# Patient Record
Sex: MALE | HISPANIC OR LATINO
[De-identification: names, ages, dates, MRNs, and addresses within clinical notes are randomized per-mention and may not be internally consistent; named-entity substitution may affect disease eponyms.]

---

## 2018-11-12 ENCOUNTER — RX RENEWAL (OUTPATIENT)
Age: 63
End: 2018-11-12

## 2019-02-07 ENCOUNTER — RX RENEWAL (OUTPATIENT)
Age: 64
End: 2019-02-07

## 2019-04-15 ENCOUNTER — APPOINTMENT (OUTPATIENT)
Dept: CARDIOLOGY | Facility: CLINIC | Age: 64
End: 2019-04-15
Payer: MEDICAID

## 2019-04-15 ENCOUNTER — NON-APPOINTMENT (OUTPATIENT)
Age: 64
End: 2019-04-15

## 2019-04-15 ENCOUNTER — RECORD ABSTRACTING (OUTPATIENT)
Age: 64
End: 2019-04-15

## 2019-04-15 VITALS
DIASTOLIC BLOOD PRESSURE: 80 MMHG | SYSTOLIC BLOOD PRESSURE: 130 MMHG | RESPIRATION RATE: 16 BRPM | HEART RATE: 68 BPM | HEIGHT: 68 IN | BODY MASS INDEX: 23.04 KG/M2 | WEIGHT: 152 LBS

## 2019-04-15 DIAGNOSIS — Z78.9 OTHER SPECIFIED HEALTH STATUS: ICD-10-CM

## 2019-04-15 DIAGNOSIS — I51.9 HEART DISEASE, UNSPECIFIED: ICD-10-CM

## 2019-04-15 DIAGNOSIS — Z00.00 ENCOUNTER FOR GENERAL ADULT MEDICAL EXAMINATION W/OUT ABNORMAL FINDINGS: ICD-10-CM

## 2019-04-15 DIAGNOSIS — N40.0 BENIGN PROSTATIC HYPERPLASIA WITHOUT LOWER URINARY TRACT SYMPMS: ICD-10-CM

## 2019-04-15 DIAGNOSIS — Z91.89 OTHER SPECIFIED PERSONAL RISK FACTORS, NOT ELSEWHERE CLASSIFIED: ICD-10-CM

## 2019-04-15 PROCEDURE — 99214 OFFICE O/P EST MOD 30 MIN: CPT

## 2019-04-15 PROCEDURE — 93000 ELECTROCARDIOGRAM COMPLETE: CPT

## 2019-04-15 RX ORDER — MULTIVITAMIN
TABLET ORAL DAILY
Refills: 0 | Status: ACTIVE | COMMUNITY

## 2019-04-15 RX ORDER — TAMSULOSIN HYDROCHLORIDE 0.4 MG/1
0.4 CAPSULE ORAL
Qty: 30 | Refills: 5 | Status: ACTIVE | COMMUNITY

## 2019-04-15 RX ORDER — CALCIUM CARBONATE/VITAMIN D3 600 MG-10
TABLET ORAL DAILY
Refills: 0 | Status: ACTIVE | COMMUNITY

## 2019-04-15 RX ORDER — LISINOPRIL 10 MG/1
10 TABLET ORAL
Qty: 90 | Refills: 3 | Status: ACTIVE | COMMUNITY

## 2019-04-16 NOTE — ASSESSMENT
[FreeTextEntry1] : ECG:  Normal sinus rhythm at 68.  Normal axis and intervals.  No significant ST-T wave changes. \par \par Laboratory data 3/22/19 obtained and reviewed:  The hemoglobin is 16.1.  Electrolytes and LFTs are normal. \par \par The PSA is 8.7.  \par \par Impression:\par 1.  The patient has been hemodynamic stable without any signs or symptoms of heart failure. \par 2.  He has rare social alcohol. \par 3.  His medications are well tolerated. \par 4.  He is limiting his alcohol use. \par 5.  Blood pressure and weight have all been stable. \par \par The plan is to:\par 1.  Proceed with an echocardiogram in the near future to reassess left ventricular systolic function.  \par 2.  Continuation of current meds. \par 3.  Obtain a lipid panel through Dr. Nair’s office in the future.  Clinical follow up otherwise in six months. \par

## 2019-04-16 NOTE — REASON FOR VISIT
[FreeTextEntry1] : This is a 63-year-old male who presents here for cardiac reevaluation with regard to history which includes:\par 1.  Hypertension.\par 2.  Hyperlipidemia.\par 3.  Cardiomyopathy. \par 4.  Prior ethanol use. \par \par His initial left ventricular ejection fraction of 45% had improved by his last echocardiogram up to 55-60% with mild mitral and tricuspid insufficiency. \par \par The patient remains feeling relatively well.  He denies any shortness of breath, chest pain, palpitations, PND, orthopnea, or edema.  Only intercurrent medical problem has been the interval development of an elevated PSA for which a prostate biopsy is planned in the near future.  \par \par No PND, orthopnea, syncope, or near syncope. \par \par Social alcohol.  \par \par \par    \par

## 2019-05-07 ENCOUNTER — RX RENEWAL (OUTPATIENT)
Age: 64
End: 2019-05-07

## 2020-04-02 ENCOUNTER — APPOINTMENT (OUTPATIENT)
Dept: CARDIOLOGY | Facility: CLINIC | Age: 65
End: 2020-04-02

## 2020-05-11 ENCOUNTER — RX RENEWAL (OUTPATIENT)
Age: 65
End: 2020-05-11

## 2020-06-08 ENCOUNTER — APPOINTMENT (OUTPATIENT)
Dept: CARDIOLOGY | Facility: CLINIC | Age: 65
End: 2020-06-08

## 2020-06-26 ENCOUNTER — APPOINTMENT (OUTPATIENT)
Dept: CARDIOLOGY | Facility: CLINIC | Age: 65
End: 2020-06-26
Payer: MEDICAID

## 2020-06-26 PROCEDURE — 93306 TTE W/DOPPLER COMPLETE: CPT

## 2020-07-09 ENCOUNTER — APPOINTMENT (OUTPATIENT)
Dept: CARDIOLOGY | Facility: CLINIC | Age: 65
End: 2020-07-09
Payer: MEDICAID

## 2020-07-09 ENCOUNTER — NON-APPOINTMENT (OUTPATIENT)
Age: 65
End: 2020-07-09

## 2020-07-09 VITALS
DIASTOLIC BLOOD PRESSURE: 76 MMHG | HEART RATE: 59 BPM | BODY MASS INDEX: 23.34 KG/M2 | TEMPERATURE: 98.3 F | OXYGEN SATURATION: 98 % | WEIGHT: 154 LBS | SYSTOLIC BLOOD PRESSURE: 110 MMHG | RESPIRATION RATE: 16 BRPM | HEIGHT: 68 IN

## 2020-07-09 DIAGNOSIS — Z87.438 PERSONAL HISTORY OF OTHER DISEASES OF MALE GENITAL ORGANS: ICD-10-CM

## 2020-07-09 PROCEDURE — 99214 OFFICE O/P EST MOD 30 MIN: CPT

## 2020-07-09 PROCEDURE — 93000 ELECTROCARDIOGRAM COMPLETE: CPT

## 2020-07-09 RX ORDER — FINASTERIDE 5 MG/1
5 TABLET, FILM COATED ORAL
Qty: 90 | Refills: 3 | Status: ACTIVE | COMMUNITY
Start: 2020-07-09

## 2020-07-09 NOTE — REASON FOR VISIT
[FreeTextEntry1] : This is a 64 -year-old male who presents here for cardiac reevaluation with regard to history which includes:\par 1.  Hypertension.\par 2.  Hyperlipidemia.\par 3.  Cardiomyopathy. \par 4.  Prior ethanol use. \par \par His initial left ventricular ejection fraction of 45% 2016 had improved by his last echocardiogram up to 55-60%  2017 with mild mitral and tricuspid insufficiency. \par \par \par has now had a repeat performed and is here to review\par \par The patient remains feeling relatively well.  He denies any shortness of breath, chest pain, palpitations, PND, orthopnea, or edema.  Only intercurrent medical problem has been the interval development of an elevated PSA \par Took Finasteride and Flomax with subsequent decrease in PSA\par \par No PND, orthopnea, syncope, or near syncope. \par \par Social alcohol.  \par \par \par    \par

## 2020-07-09 NOTE — ASSESSMENT
[FreeTextEntry1] : ECG:  Normal sinus rhythm at 59.  Normal axis and intervals.  No significant ST-T wave changes. \par \par Laboratory data 6/9/20\par Cholesterol is 262\par HDL 65\par \par Triglycerides 172\par PSA 6.5\par Hemoglobin 15.6\par Electrolytes and LFTs are normal\par \par Echocardiogram: Success 26/20:\par Normal LV size with overall low normal function. Left ventricular ejection fraction 50%\par Aortic root mildly dilated\par Trace mitral insufficiency.\par mildly dilated aortic root\par \par \par Impression:\par 1.  The patient has been hemodynamic stable without any signs or symptoms of heart failure. \par 2.  He has rare social alcohol. \par 3.  His medications are well tolerated, but he has stopped using Atorvastatin as evidenced by the elevated LDL\par 4.  He is limiting his alcohol use. \par 5.  Blood pressure and weight have all been stable. \par 6.  The LVEF has dropped a bit compared with the echo of 2017\par \par The plan is to:\par 1.    Must resume atorvastatin 20 mg a day repeat laboratories in 3 months.\par \par 2. Continuation of other medications ACE inhibitor beta blocker indefinitely.\par \par 3. Slightly dilated aortic root to be checked again in one year.\par \par  Clinical follow up otherwise in six months. \par

## 2020-08-11 ENCOUNTER — RX RENEWAL (OUTPATIENT)
Age: 65
End: 2020-08-11

## 2021-06-22 ENCOUNTER — APPOINTMENT (OUTPATIENT)
Dept: CARDIOLOGY | Facility: CLINIC | Age: 66
End: 2021-06-22
Payer: MEDICAID

## 2021-06-22 VITALS
SYSTOLIC BLOOD PRESSURE: 120 MMHG | HEIGHT: 68 IN | BODY MASS INDEX: 23.19 KG/M2 | DIASTOLIC BLOOD PRESSURE: 80 MMHG | TEMPERATURE: 98.2 F | OXYGEN SATURATION: 97 % | RESPIRATION RATE: 16 BRPM | HEART RATE: 63 BPM | WEIGHT: 153 LBS

## 2021-06-22 PROCEDURE — 99214 OFFICE O/P EST MOD 30 MIN: CPT

## 2021-06-22 PROCEDURE — 93000 ELECTROCARDIOGRAM COMPLETE: CPT

## 2021-06-22 NOTE — ASSESSMENT
[FreeTextEntry1] : ECG:  Normal sinus rhythm at 63.  Normal axis and intervals.  No significant ST-T wave changes. \par \par Laboratory data \par         6/9/20 6/21/2021:\par Chol     262          225\par HDL       65           62\par LDL     163          145\par Trig     172            88\par PSA     6.5           9.5\par Hemoglobin 15.6\par Electrolytes and LFTs are normal\par \par Echocardiogram: Success 6/26/20:\par Normal LV size with overall low normal function. Left ventricular ejection fraction 50%\par Aortic root mildly dilated\par Trace mitral insufficiency.\par mildly dilated aortic root\par \par \par Impression:\par 1.  The patient has been hemodynamic stable without any signs or symptoms of heart failure. \par 2.  He has at least moderate  social alcohol.  \par 3.  His medications are well tolerated, but he has stopped using Atorvastatin consistently as evidenced by the elevated LDL\par 4.  Rising PSA\par 5.  Blood pressure and weight have all been stable. \par 6.  The LVEF had dropped a bit compared with the echo of 2017\par \par The plan is to:\par 1.    Must resume atorvastatin 20 mg a day repeat laboratories in 3 months.\par \par 2. Continuation of other medications ACE inhibitor beta blocker indefinitely.\par \par 3.  History of left ventricular dysfunction and slightly dilated aortic root to be checked again this year\par \par 4.  Urologic evaluation with regard to PSA\par  Clinical follow up otherwise in six months. \par

## 2021-06-22 NOTE — REASON FOR VISIT
[FreeTextEntry1] : This is a 65  -year-old male who presents here for cardiac reevaluation with regard to history which includes:\par 1.  Hypertension.\par 2.  Hyperlipidemia.\par 3.  Cardiomyopathy. \par 4.  Prior ethanol use. \par \par His initial left ventricular ejection fraction of 45% 2016 had improved by his last echocardiogram up to 55-60%  2017 with mild mitral and tricuspid insufficiency. \par \par The patient remains feeling relatively well.  He denies any shortness of breath, chest pain, palpitations, PND, orthopnea, or edema.  Only intercurrent medical problem has been the interval development of an elevated PSA \par Took Finasteride and Flomax with subsequent decrease in PSA\par \par No PND, orthopnea, syncope, or near syncope. \par \par Moderate EtOH several times a week\par \par Noncompliance with atorvastatin.\par \par    \par

## 2021-11-22 ENCOUNTER — APPOINTMENT (OUTPATIENT)
Dept: CARDIOLOGY | Facility: CLINIC | Age: 66
End: 2021-11-22
Payer: MEDICAID

## 2021-11-22 PROCEDURE — 93306 TTE W/DOPPLER COMPLETE: CPT

## 2021-12-07 ENCOUNTER — APPOINTMENT (OUTPATIENT)
Dept: CARDIOLOGY | Facility: CLINIC | Age: 66
End: 2021-12-07
Payer: MEDICAID

## 2021-12-07 VITALS
RESPIRATION RATE: 16 BRPM | SYSTOLIC BLOOD PRESSURE: 135 MMHG | WEIGHT: 150 LBS | BODY MASS INDEX: 22.73 KG/M2 | OXYGEN SATURATION: 98 % | DIASTOLIC BLOOD PRESSURE: 85 MMHG | HEIGHT: 68 IN | HEART RATE: 68 BPM

## 2021-12-07 PROCEDURE — 93000 ELECTROCARDIOGRAM COMPLETE: CPT

## 2021-12-07 PROCEDURE — 99214 OFFICE O/P EST MOD 30 MIN: CPT

## 2021-12-07 NOTE — REASON FOR VISIT
[FreeTextEntry1] : This is a 66  -year-old male who presents here for cardiac reevaluation with regard to history which includes:\par 1.  Hypertension.\par 2.  Hyperlipidemia.\par 3.  Cardiomyopathy. \par 4.  Prior ethanol use. \par \par Patient remains feeling relatively well.  He denies any shortness of breath, chest pain, palpitations, PND, orthopnea, or edema. \par  Only intercurrent medical problem has been the interval development of an elevated PSA \par Was following with urology though has since stopped.\par Took Finasteride and Flomax with subsequent decrease in PSA. Apparently they are transferring care to another urologist for a 2nd opinion.  The details of the encounter are not available\par \par Today we will discuss the results of his echo with known hx of cardiomyopathy.\par \par He continues to watch his diet and ride his bicycle without any exertional discomforts \par His daughters admits that he had been drinking more ETOH frequently and recently ended a busy work schedule. \par \par No retired and plans on visiting the [place] for 2 months \par \par \par    \par

## 2021-12-07 NOTE — ASSESSMENT
[FreeTextEntry1] : ECG:  Normal sinus rhythm at 68 delayed anterior R wave progression.  Normal axis and intervals.  No significant ST-T wave changes. \par \par Laboratory data \par         6/9/20 6/21/2021:\par Chol     262          225\par HDL       65           62\par LDL     163          145\par Trig     172            88\par PSA     6.5           9.5\par Hemoglobin 15.6\par Electrolytes and LFTs are normal\par \par Echocardiogram 11/22/2021:\par Normal LV size with mildly depressed over left ventricular cell function ejection fraction 45%\par Mild aortic root dilatation\par Mild mitral,  tricuspid,  pulmonic insufficiency\par Compared with prior study slight further decline in the LVEF.\par \par Echocardiogram: Success 6/26/20:\par Normal LV size with overall low normal function. Left ventricular ejection fraction 50%\par Aortic root mildly dilated\par Trace mitral insufficiency.\par mildly dilated aortic root\par \par \par Impression:\par 1.  The patient has been hemodynamic stable without any signs or symptoms of heart failure. \par \par 2.  He has at least moderate  social alcohol.  \par \par 3.  His medications are well tolerated and now compliant with statin. \par      Though no current labs are available.\par \par 4.  Rising PSA being pursued with urology\par \par 5.  Blood pressure and weight have all been stable. \par \par 6.  The LVEF has dropped a bit compared with the echo of 2020\par    . Initial left ventricular ejection fraction of 45% from 2016 had improved in 2017 echo to 55-60% \par     .  In 2020 LVEF equal to 50%\par       Now has dropped again to 45%\par \par -Increase ETOH may be contributing \par \par 7. No coronary symptoms\par \par 8. ECG today unchanged and at his baseline\par \par The plan is to:\par 1.  Compliance with atorvastatin 20 mg a day \par \par 2.  Continuation of other medications ACE inhibitor beta blocker indefinitely.\par \par 3.  We will repeat echocardiogram again on an annual basis.\par \par 4.  Urologic evaluation with regard to PSA\par \par 5. Absolutely no ETOH.\par \par 6. Will try to obtain BW from PCP's office for review. \par \par Daughter knows to call with any c/o SOB or increased fatigability. \par \par  Clinical follow up otherwise in six months. \par

## 2022-06-21 ENCOUNTER — RX RENEWAL (OUTPATIENT)
Age: 67
End: 2022-06-21

## 2022-08-09 ENCOUNTER — APPOINTMENT (OUTPATIENT)
Dept: CARDIOLOGY | Facility: CLINIC | Age: 67
End: 2022-08-09

## 2022-08-09 VITALS
OXYGEN SATURATION: 97 % | RESPIRATION RATE: 16 BRPM | WEIGHT: 149 LBS | HEART RATE: 77 BPM | DIASTOLIC BLOOD PRESSURE: 70 MMHG | BODY MASS INDEX: 22.58 KG/M2 | SYSTOLIC BLOOD PRESSURE: 110 MMHG | HEIGHT: 68 IN

## 2022-08-09 PROCEDURE — 99214 OFFICE O/P EST MOD 30 MIN: CPT | Mod: 25

## 2022-08-09 PROCEDURE — 93000 ELECTROCARDIOGRAM COMPLETE: CPT

## 2022-08-09 RX ORDER — MUPIROCIN 20 MG/G
2 OINTMENT TOPICAL
Qty: 22 | Refills: 0 | Status: ACTIVE | COMMUNITY
Start: 2022-06-02

## 2022-08-09 NOTE — REASON FOR VISIT
[FreeTextEntry1] : This is a 66  -year-old male who presents here for cardiac reevaluation with regard to history which includes:\par 1.  Hypertension.\par 2.  Hyperlipidemia.\par 3.  Cardiomyopathy. \par 4.  Prior ETOH  use. \par \par Patient remains feeling relatively well.  He denies any shortness of breath, chest pain, palpitations, PND, orthopnea, or edema. \par  Only intercurrent medical problem has been the interval development of an elevated PSA \par Was following with urology though has since stopped.\par Took Finasteride and Flomax with subsequent decrease in PSA. Apparently they are transferring care to another urologist for a 2nd opinion.  The details of the encounter are not available\par \par \par \par He continues to watch his diet and ride his bicycle without any exertional discomforts \par His daughters admits that he had been drinking more ETOH frequently and recently ended a busy work schedule. \par \par No retired and plans on visiting the [place] for 2 months \par \par \par    \par

## 2022-08-09 NOTE — ASSESSMENT
[FreeTextEntry1] : ECG:  Normal sinus rhythm at 77  delayed anterior R wave progression.  Normal axis and intervals.  No significant ST-T wave changes. \par \par Laboratory data \par \par  -----6/9/20------6/21/21---6/3/22\par Chol----262-------225------235\par HDL-----65---------62-------61\par LDL----163-------145-------135\par Trig-----172-------88--------102\par PSA-----6.5-------9.5-------11.5\par Hemoglobin 15.6\par Electrolytes and LFTs are normal\par \par Echocardiogram 11/22/2021:\par Normal LV size with mildly depressed over left ventricular cell function ejection fraction 45%\par Mild aortic root dilatation\par Mild mitral,  tricuspid,  pulmonic insufficiency\par Compared with prior study slight further decline in the LVEF.\par \par Echocardiogram: 6/26/20:\par Normal LV size with overall low normal function. Left ventricular ejection fraction 50%\par Aortic root mildly dilated\par Trace mitral insufficiency.\par mildly dilated aortic root\par \par \par Impression:\par 1.  The patient has been hemodynamic stable without any signs or symptoms of heart failure. \par \par 2.  He has at least moderate  social alcohol.  \par \par 3.  Suboptimal lipid control.  June blood work may have been on 40 mg.  Difficult to verify.  In any case not at target.\par \par 4.  Rising PSA without current urologic involvement.\par \par 5.  Blood pressure and weight have all been stable. \par \par 6.  The LVEF has dropped a bit compared with the echo of 2020\par    . Initial left ventricular ejection fraction of 45% from 2016 had improved in 2017 echo to 55-60% \par     .  In 2020 LVEF equal to 50%\par       11/22/21  dropped again to 45%\par \par -Increase ETOH may be contributing \par \par 7. No coronary symptoms\par \par 8. ECG today unchanged and at his baseline\par \par The plan is to:\par 1.  Compliance with atorvastatin 40 mg a day \par \par 2.  Continuation of other medications ACE inhibitor beta blocker indefinitely.\par \par 3.  We will repeat echocardiogram again on an annual basis.\par \par 4.  Urologic evaluation with regard to PSA\par \par 5. Absolutely no ETOH.\par \par Daughter knows to call with any c/o SOB or increased fatigability. \par \par  Clinical follow up otherwise in six months. \par

## 2022-08-09 NOTE — HISTORY OF PRESENT ILLNESS
[FreeTextEntry1] : No PND, orthopnea, syncope, or near syncope. \par \par Recently elected to reduce his atorvastatin dose to 10 mg.  States that it works well for his wife so why not for him?\par

## 2022-12-15 ENCOUNTER — APPOINTMENT (OUTPATIENT)
Dept: CARDIOLOGY | Facility: CLINIC | Age: 67
End: 2022-12-15

## 2023-09-12 ENCOUNTER — RX RENEWAL (OUTPATIENT)
Age: 68
End: 2023-09-12

## 2023-10-19 ENCOUNTER — APPOINTMENT (OUTPATIENT)
Dept: CARDIOLOGY | Facility: CLINIC | Age: 68
End: 2023-10-19
Payer: MEDICARE

## 2023-10-19 VITALS
SYSTOLIC BLOOD PRESSURE: 110 MMHG | BODY MASS INDEX: 21.98 KG/M2 | OXYGEN SATURATION: 97 % | HEIGHT: 68 IN | DIASTOLIC BLOOD PRESSURE: 60 MMHG | RESPIRATION RATE: 16 BRPM | HEART RATE: 65 BPM | WEIGHT: 145 LBS

## 2023-10-19 DIAGNOSIS — I51.7 CARDIOMEGALY: ICD-10-CM

## 2023-10-19 DIAGNOSIS — I10 ESSENTIAL (PRIMARY) HYPERTENSION: ICD-10-CM

## 2023-10-19 DIAGNOSIS — E78.5 HYPERLIPIDEMIA, UNSPECIFIED: ICD-10-CM

## 2023-10-19 DIAGNOSIS — I42.8 OTHER CARDIOMYOPATHIES: ICD-10-CM

## 2023-10-19 PROCEDURE — 93306 TTE W/DOPPLER COMPLETE: CPT

## 2023-10-19 PROCEDURE — 93000 ELECTROCARDIOGRAM COMPLETE: CPT

## 2023-10-19 PROCEDURE — 99214 OFFICE O/P EST MOD 30 MIN: CPT | Mod: 25

## 2023-10-19 RX ORDER — ATORVASTATIN CALCIUM 40 MG/1
40 TABLET, FILM COATED ORAL DAILY
Qty: 90 | Refills: 3 | Status: DISCONTINUED | COMMUNITY
Start: 2022-07-26 | End: 2023-10-19

## 2023-10-19 RX ORDER — METOPROLOL SUCCINATE 25 MG/1
25 TABLET, EXTENDED RELEASE ORAL DAILY
Qty: 90 | Refills: 3 | Status: ACTIVE | COMMUNITY
Start: 2018-11-12 | End: 1900-01-01

## 2023-10-19 RX ORDER — FAMOTIDINE 40 MG/1
40 TABLET, FILM COATED ORAL
Qty: 30 | Refills: 0 | Status: DISCONTINUED | COMMUNITY
Start: 2022-07-26 | End: 2023-10-19

## 2023-10-19 RX ORDER — OMEPRAZOLE 40 MG/1
40 CAPSULE, DELAYED RELEASE ORAL
Qty: 30 | Refills: 0 | Status: DISCONTINUED | COMMUNITY
Start: 2022-07-26 | End: 2023-10-19

## 2023-10-19 RX ORDER — ATORVASTATIN CALCIUM 10 MG/1
10 TABLET, FILM COATED ORAL
Qty: 90 | Refills: 3 | Status: ACTIVE | COMMUNITY
Start: 2020-07-09 | End: 1900-01-01

## 2023-11-06 ENCOUNTER — NON-APPOINTMENT (OUTPATIENT)
Age: 68
End: 2023-11-06

## 2024-10-04 ENCOUNTER — RX RENEWAL (OUTPATIENT)
Age: 69
End: 2024-10-04